# Patient Record
Sex: MALE | Race: BLACK OR AFRICAN AMERICAN | NOT HISPANIC OR LATINO | Employment: FULL TIME | URBAN - METROPOLITAN AREA
[De-identification: names, ages, dates, MRNs, and addresses within clinical notes are randomized per-mention and may not be internally consistent; named-entity substitution may affect disease eponyms.]

---

## 2024-04-07 ENCOUNTER — OFFICE VISIT (OUTPATIENT)
Dept: URGENT CARE | Facility: URGENT CARE | Age: 29
End: 2024-04-07
Payer: COMMERCIAL

## 2024-04-07 VITALS
OXYGEN SATURATION: 98 % | SYSTOLIC BLOOD PRESSURE: 117 MMHG | DIASTOLIC BLOOD PRESSURE: 72 MMHG | HEART RATE: 68 BPM | TEMPERATURE: 97.7 F | RESPIRATION RATE: 15 BRPM

## 2024-04-07 DIAGNOSIS — M25.512 LEFT SHOULDER PAIN, UNSPECIFIED CHRONICITY: Primary | ICD-10-CM

## 2024-04-07 PROCEDURE — 99203 OFFICE O/P NEW LOW 30 MIN: CPT | Performed by: STUDENT IN AN ORGANIZED HEALTH CARE EDUCATION/TRAINING PROGRAM

## 2024-04-07 RX ORDER — CYCLOBENZAPRINE HCL 5 MG
5 TABLET ORAL 3 TIMES DAILY PRN
Qty: 20 TABLET | Refills: 0 | Status: SHIPPED | OUTPATIENT
Start: 2024-04-07

## 2024-04-07 RX ORDER — NAPROXEN 500 MG/1
500 TABLET ORAL 2 TIMES DAILY WITH MEALS
Qty: 20 TABLET | Refills: 0 | Status: SHIPPED | OUTPATIENT
Start: 2024-04-07 | End: 2024-04-17

## 2024-04-07 ASSESSMENT — PAIN SCALES - GENERAL: PAINLEVEL: SEVERE PAIN (7)

## 2024-04-08 NOTE — PROGRESS NOTES
Urgent Care - 04/07/2024      HPI:      Clifford Rodríguez is a 28 year old male here for evaluation of shoulder pain.     Left neck/back pain started 2 days ago over the left neck and shoulder. Today is having lots of pain starting from the shoulder and difficulty with using the left arm as it causes pain. Pain is moslty over the lateral shoulder and trap muscle.     No specific injury remembered.     Works as .     No history of things like this before.     Arm fell asleep this am, but no numbness or tingling throughout the day. No muscle weakness.     Did not try ibuprofen, tylenol, ice.        Review of Systems  Complete ROS negative unless noted in the HPI above.     Allergies  No Known Allergies    Outpatient Medications  Current Outpatient Medications   Medication Sig Dispense Refill    cyclobenzaprine (FLEXERIL) 5 MG tablet Take 1 tablet (5 mg) by mouth 3 times daily as needed for muscle spasms 20 tablet 0    naproxen (NAPROSYN) 500 MG tablet Take 1 tablet (500 mg) by mouth 2 times daily (with meals) for 10 days 20 tablet 0     No current facility-administered medications for this visit.       Past Medical History  There is no problem list on file for this patient.      Family History  No family history on file.    Social History  Social History     Tobacco Use    Smoking status: Never    Smokeless tobacco: Never   Substance Use Topics    Alcohol use: Not on file        Objective:      /72 (BP Location: Right arm, Patient Position: Sitting, Cuff Size: Adult Large)   Pulse 68   Temp 97.7  F (36.5  C) (Temporal)   Resp 15   SpO2 98%     General: well-appearing, in no acute distress.  HEENT: NC/AT, MMM  MSK: Pain to palpation over the left trapezius muscle, in the subacromial space, and somewhat over the biceps tendon.  Range of motion is full, but elicits pain.  No significant swelling or asymmetry of the joint space.  No numbness or tingling down the arms,  strength equal  bilaterally.  No point tenderness over the C-spine or T-spine.     Lab & Imaging Results    No results found for this or any previous visit (from the past 24 hour(s)).    I personally reviewed these results and discussed findings with the patient.      Assessment & Plan:   Clifford Rodríguez is a 28 year old yo male, who presented with shoulder pain, likely secondary to muscle spasm of the neck and shoulder.  No specific injury, therefore did not feel the need to pursue x-ray imaging at this time.  Suspect this will improve with supportive cares.    Left shoulder pain, unspecified chronicity  No red flag symptoms such as numbness, tingling, or weakness in the left arm or hand.  Will trial ibuprofen, Flexeril, Tylenol, icing, and rest with gentle range of motion exercises.  Advised him to follow-up with sports medicine in 1 week, if things or not getting better.  Advised to return to clinic if he has increasing swelling, redness of the shoulder, fevers, worsening symptoms that he would like evaluated.  -     naproxen (NAPROSYN) 500 MG tablet; Take 1 tablet (500 mg) by mouth 2 times daily (with meals) for 10 days  -     cyclobenzaprine (FLEXERIL) 5 MG tablet; Take 1 tablet (5 mg) by mouth 3 times daily as needed for muscle spasms  -     Orthopedic  Referral; Future    Discussed the above with the patient, who stated understanding and agreed with the plan.     Camila Escamilla MD  Internal Medicine and Pediatrics   Urgent Care

## 2024-04-08 NOTE — PATIENT INSTRUCTIONS
You presentation is most consistent with an Muscle spasm of the Shoulder and/or Tendinitis.    Medications:   - Please take Naproxen two times a day for 10 days. Do not take any additional ibuprofen/advil/motrin/aleve while you are taking this.   - Take Tylenol as needed throughout the day.   - Use Topical Voltaren to use 4 times a day.    Ice:  - Ice the affected area for 20 minutes 3-4 times a day until swelling is improved     Activities:   - Avoid any activities that cause pain.   - Please do gentle stretching of the shoulder to help with symptoms.     Please make an appointment with Sports Medicine if this is not feeling better in one week.

## 2024-04-12 ENCOUNTER — OFFICE VISIT (OUTPATIENT)
Dept: FAMILY MEDICINE | Facility: CLINIC | Age: 29
End: 2024-04-12
Payer: COMMERCIAL

## 2024-04-12 VITALS
OXYGEN SATURATION: 97 % | SYSTOLIC BLOOD PRESSURE: 110 MMHG | DIASTOLIC BLOOD PRESSURE: 75 MMHG | TEMPERATURE: 98.1 F | BODY MASS INDEX: 28.14 KG/M2 | RESPIRATION RATE: 17 BRPM | WEIGHT: 179.3 LBS | HEART RATE: 67 BPM | HEIGHT: 67 IN

## 2024-04-12 DIAGNOSIS — S46.812D TRAPEZIUS STRAIN, LEFT, SUBSEQUENT ENCOUNTER: Primary | ICD-10-CM

## 2024-04-12 PROCEDURE — 99213 OFFICE O/P EST LOW 20 MIN: CPT | Performed by: PHYSICIAN ASSISTANT

## 2024-04-12 ASSESSMENT — PAIN SCALES - GENERAL: PAINLEVEL: NO PAIN (0)

## 2024-04-12 NOTE — PROGRESS NOTES
"  Assessment & Plan     (B02.656C) Trapezius strain, left, subsequent encounter  (primary encounter diagnosis)  Comment:   Plan: Sx are resolving, advised to contineu with home PT, heat, NSAIDs and prn muscle relaxers.  He reports that he has an annual exam scheduled for [place] when he returns home, advised to have labs and updated vaccines sent to us in [place]          MED REC REQUIRED  Post Medication Reconciliation Status:  Discharge medications reconciled, continue medications without change  BMI  Estimated body mass index is 27.75 kg/m  as calculated from the following:    Height as of this encounter: 1.712 m (5' 7.4\").    Weight as of this encounter: 81.3 kg (179 lb 4.8 oz).             Mauricio Horton is a 28 year old, presenting for the following health issues:  Hospital F/U      4/12/2024    11:30 AM   Additional Questions   Roomed by Kaila CALDERON       ED/UC Followup:    Facility:  [facility]  Date of visit: 04/07/2024  Reason for visit: Back and left shoulder pain.  Current Status: Improved, but experiencing chest pain, light headedness, but not today.         Pt noting that his L shoulder trapezius pain is improving, nearly resolved with naproxen and flexeril.  Some lingering L chest wall pain with movements, he feels this is related to working as a .    No worsening pain in his shoulder, ROM has remained intact.        Objective    /75 (BP Location: Right arm, Patient Position: Sitting, Cuff Size: Adult Regular)   Pulse 67   Temp 98.1  F (36.7  C) (Temporal)   Resp 17   Ht 1.712 m (5' 7.4\")   Wt 81.3 kg (179 lb 4.8 oz)   SpO2 97%   BMI 27.75 kg/m    Body mass index is 27.75 kg/m .  Physical Exam     Left Shoulder Exam   Left shoulder exam is normal.    Tenderness   The patient is experiencing no tenderness.     Range of Motion   The patient has normal left shoulder ROM.    Muscle Strength   The patient has normal left shoulder strength.    Tests   Apprehension: " negative  Taylor test: negative    Other   Sensation: normal     Comments:  L trapezius with remaining knot in superior portion                    Signed Electronically by: Ajay Orantes PA-C